# Patient Record
Sex: FEMALE | Race: WHITE | Employment: FULL TIME | URBAN - METROPOLITAN AREA
[De-identification: names, ages, dates, MRNs, and addresses within clinical notes are randomized per-mention and may not be internally consistent; named-entity substitution may affect disease eponyms.]

---

## 2020-10-09 ENCOUNTER — TELEPHONE (OUTPATIENT)
Dept: VASCULAR SURGERY | Facility: CLINIC | Age: 35
End: 2020-10-09

## 2020-10-09 NOTE — TELEPHONE ENCOUNTER
" [facility]    Phone Message    May a detailed message be left on voicemail: yes     Reason for Call: Patient submitted an online appointment request to see Dr. Dai for a Dx of Thoracic Outlet Syndrome. Patient submitted this on the online form: \"continued right arm pain/numbness, dx w thoracic outlet syndrome in July via clinical dx, been in PT since\"    Please contact patient with appointment options    Action Taken: Message routed to:  [facility] ([facility] Vascular    Travel Screening: Not Applicable                                                                      "

## 2020-10-09 NOTE — TELEPHONE ENCOUNTER
Message left for Allyson inquiring about her TOS symptoms and asking for records from the referring provider. Call back number given.

## 2020-10-19 ENCOUNTER — TELEPHONE (OUTPATIENT)
Dept: VASCULAR SURGERY | Facility: CLINIC | Age: 35
End: 2020-10-19

## 2020-10-19 DIAGNOSIS — G54.0 THORACIC OUTLET SYNDROME: Primary | ICD-10-CM

## 2020-10-19 NOTE — TELEPHONE ENCOUNTER
Spoke with patient regarding TOS symptoms. She is going to get her TOS US and will have a video visit with Dr. Dai on 11/2.

## 2020-10-21 ENCOUNTER — ANCILLARY PROCEDURE (OUTPATIENT)
Dept: ULTRASOUND IMAGING | Facility: CLINIC | Age: 35
End: 2020-10-21
Attending: SURGERY
Payer: COMMERCIAL

## 2020-10-21 DIAGNOSIS — G54.0 THORACIC OUTLET SYNDROME: ICD-10-CM

## 2020-10-21 DIAGNOSIS — G54.0 THORACIC OUTLET SYNDROME: Primary | ICD-10-CM

## 2020-10-21 PROCEDURE — 93930 UPPER EXTREMITY STUDY: CPT | Mod: GC | Performed by: RADIOLOGY

## 2020-10-21 PROCEDURE — 93970 EXTREMITY STUDY: CPT | Mod: GC | Performed by: RADIOLOGY

## 2020-11-02 ENCOUNTER — TELEPHONE (OUTPATIENT)
Dept: VASCULAR SURGERY | Facility: CLINIC | Age: 35
End: 2020-11-02

## 2020-11-02 ENCOUNTER — PRE VISIT (OUTPATIENT)
Dept: VASCULAR SURGERY | Facility: CLINIC | Age: 35
End: 2020-11-02

## 2020-11-02 NOTE — TELEPHONE ENCOUNTER
Spoke with patient regarding video visit with  today being missed.    Called patient back to see if they could reschedule to Wednesday.    Patient stated that is the day of procedure, but could do Tuesday afternoon at 3:00.    I told pt that I would let Lata know and either call back today or tomorrow morning.

## 2020-11-02 NOTE — TELEPHONE ENCOUNTER
LM for patient regarding appointment today at 3:30 with  for a video visit.    Will try back in 15 mins.    Call back number was provided.

## 2020-11-03 ENCOUNTER — TELEPHONE (OUTPATIENT)
Dept: VASCULAR SURGERY | Facility: CLINIC | Age: 35
End: 2020-11-03

## 2020-11-03 NOTE — TELEPHONE ENCOUNTER
The pt thought you meant Wednesday for her appt with Dr Dai. She can't do it today-please call the pt to reschedule. Thanks.

## 2020-11-03 NOTE — TELEPHONE ENCOUNTER
LM for patient regarding appointment that was rescheduled for today at 3:00 video visit with  per patient request as the video visit was missed yesterday.    Calling back patient as there was a message that pt need to reschedule appointment from today at 3:00 because today doesn't work and they thought it was scheduled for tomorrow.    I stated that if they would like to proceed with the video visit today I will call back if they need to reschedule to tomorrow they can call and reschedule with .    Call back number was provided.

## 2020-11-11 ENCOUNTER — TELEPHONE (OUTPATIENT)
Dept: VASCULAR SURGERY | Facility: CLINIC | Age: 35
End: 2020-11-11

## 2020-11-11 NOTE — TELEPHONE ENCOUNTER
Spoke with patient regarding scheduling a virtual visit with .    Patient stated that she was worry for missing two appointments.    Patient stated she had a recent dx and will call to reschedule when the course of abx was completed.    Scheduling and Lata's office numbers were provided.